# Patient Record
Sex: FEMALE | Race: BLACK OR AFRICAN AMERICAN | NOT HISPANIC OR LATINO | Employment: OTHER | URBAN - METROPOLITAN AREA
[De-identification: names, ages, dates, MRNs, and addresses within clinical notes are randomized per-mention and may not be internally consistent; named-entity substitution may affect disease eponyms.]

---

## 2019-07-09 NOTE — PATIENT DISCUSSION
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 Continue regular follow up with Dr. Billy Kathleen for injections

## 2019-11-20 ENCOUNTER — IMPORTED ENCOUNTER (OUTPATIENT)
Dept: URBAN - METROPOLITAN AREA CLINIC 9 | Facility: CLINIC | Age: 74
End: 2019-11-20

## 2020-01-08 ENCOUNTER — IMPORTED ENCOUNTER (OUTPATIENT)
Dept: URBAN - METROPOLITAN AREA CLINIC 9 | Facility: CLINIC | Age: 75
End: 2020-01-08

## 2020-07-13 ENCOUNTER — IMPORTED ENCOUNTER (OUTPATIENT)
Dept: URBAN - METROPOLITAN AREA CLINIC 9 | Facility: CLINIC | Age: 75
End: 2020-07-13

## 2021-01-04 ENCOUNTER — IMPORTED ENCOUNTER (OUTPATIENT)
Dept: URBAN - METROPOLITAN AREA CLINIC 9 | Facility: CLINIC | Age: 76
End: 2021-01-04

## 2021-06-15 NOTE — PATIENT DISCUSSION
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 Continue regular follow up with Dr. Jasvir Herzog for injections

## 2021-07-06 ENCOUNTER — IMPORTED ENCOUNTER (OUTPATIENT)
Dept: URBAN - METROPOLITAN AREA CLINIC 9 | Facility: CLINIC | Age: 76
End: 2021-07-06

## 2021-07-06 PROBLEM — E11.9: Noted: 2021-07-06

## 2021-10-16 ASSESSMENT — VISUAL ACUITY
OS_SC: 20/20 -3 SN
OS_SC: 20/25 SN
OD_SC: 20/25 + SN
OD_SC: 20/25 -2 SN
OS_SC: 20/30 SN
OD_SC: 20/25 - SN
OS_SC: 20/25 SN
OD_SC: 20/25 -2 SN

## 2021-10-16 ASSESSMENT — TONOMETRY
OD_IOP_MMHG: 12
OD_IOP_MMHG: 16
OD_IOP_MMHG: 20
OS_IOP_MMHG: 21
OS_IOP_MMHG: 16
OD_IOP_MMHG: 16
OS_IOP_MMHG: 17
OS_IOP_MMHG: 13

## 2021-10-16 ASSESSMENT — PACHYMETRY
OS_CT_UM: 560.0
OD_CT_UM: 565.0

## 2021-11-22 NOTE — PROCEDURE NOTE: CLINICAL
PROCEDURE NOTE: Intravitreal Beovu OS. Diagnosis: Neovascular AMD with Active CNV. Anesthesia: Topical/Subconjunctival. Prep: Betadine Drops and Betadine Scrub. Prior to the original injection, risks/benefits/alternatives discussed including infection, loss of vision, hemorrhage, cataract, glaucoma, retinal tears or detachment. The patient wished to proceed with treatment. Betadine prep was performed. Topical anesthesia was induced with Alcaine. A drop of Povidone-iodine 5% ophthalmic solution was instilled over the injection site and in the inferior fornix. A single use prefilled syringe of intravitreal Beovu 6mg/0.05ml was used and excess discarded. The needle was passed 3.0 mm posterior to the limbus in pseudophakic patients, and 3.5 mm posterior to the limbus in phakic patients. Patient tolerated procedure well. The eye was irrigated with sterile irrigating solution. CF vision checked. There were no complications. Post procedure instructions given. Renaldo Rivas

## 2021-11-22 NOTE — PATIENT DISCUSSION
Discussed natural history of ocular condition and the patient understands findings, prognosis and need for follow up care.

## 2021-11-22 NOTE — PATIENT DISCUSSION
Recommended Beovu injection today. The injection was given and tolerated well by patient. Post-injection instructions were reviewed and understood by the patient.

## 2021-12-01 ENCOUNTER — VISUAL FIELD ONLY (OUTPATIENT)
Dept: URBAN - METROPOLITAN AREA CLINIC 6 | Facility: CLINIC | Age: 76
End: 2021-12-01

## 2021-12-01 DIAGNOSIS — H40.013: ICD-10-CM

## 2021-12-01 PROCEDURE — 92083 EXTENDED VISUAL FIELD XM: CPT

## 2022-01-20 NOTE — PROCEDURE NOTE: CLINICAL
PROCEDURE NOTE: Intravitreal Beovu OS. Diagnosis: Neovascular AMD with Active CNV. Anesthesia: Topical/Subconjunctival. Prep: Betadine Drops and Betadine Scrub. Prior to the original injection, risks/benefits/alternatives discussed including infection, loss of vision, hemorrhage, cataract, glaucoma, retinal tears or detachment. The patient wished to proceed with treatment. Betadine prep was performed. Topical anesthesia was induced with Alcaine. A drop of Povidone-iodine 5% ophthalmic solution was instilled over the injection site and in the inferior fornix. A single use prefilled syringe of intravitreal Beovu 6mg/0.05ml was used and excess discarded. The needle was passed 3.0 mm posterior to the limbus in pseudophakic patients, and 3.5 mm posterior to the limbus in phakic patients. Patient tolerated procedure well. The eye was irrigated with sterile irrigating solution. CF vision checked. There were no complications. Post procedure instructions given. Mayeline Rhiannon

## 2022-03-17 NOTE — PROCEDURE NOTE: CLINICAL
PROCEDURE NOTE: Intravitreal Beovu OS. Diagnosis: Neovascular AMD with Active CNV. Anesthesia: Topical/Subconjunctival. Prep: Betadine Drops and Betadine Scrub. Prior to the original injection, risks/benefits/alternatives discussed including infection, loss of vision, hemorrhage, cataract, glaucoma, retinal tears or detachment. The patient wished to proceed with treatment. Betadine prep was performed. Topical anesthesia was induced with Alcaine. A drop of Povidone-iodine 5% ophthalmic solution was instilled over the injection site and in the inferior fornix. A single use prefilled syringe of intravitreal Beovu 6mg/0.05ml was used and excess discarded. The needle was passed 3.0 mm posterior to the limbus in pseudophakic patients, and 3.5 mm posterior to the limbus in phakic patients. Patient tolerated procedure well. The eye was irrigated with sterile irrigating solution. CF vision checked. There were no complications. Post procedure instructions given. Elliot Prajapati

## 2022-04-22 ENCOUNTER — ESTABLISHED PATIENT (OUTPATIENT)
Dept: URBAN - METROPOLITAN AREA CLINIC 6 | Facility: CLINIC | Age: 77
End: 2022-04-22

## 2022-04-22 DIAGNOSIS — H25.13: ICD-10-CM

## 2022-04-22 DIAGNOSIS — H40.013: ICD-10-CM

## 2022-04-22 PROCEDURE — 92012 INTRM OPH EXAM EST PATIENT: CPT

## 2022-04-22 ASSESSMENT — VISUAL ACUITY
OU_SC: 20/20-1
OS_SC: 20/25-1
OD_SC: 20/30+1

## 2022-04-22 ASSESSMENT — TONOMETRY
OD_IOP_MMHG: 21
OS_IOP_MMHG: 21

## 2022-05-12 NOTE — PROCEDURE NOTE: CLINICAL
PROCEDURE NOTE: Intravitreal Beovu OS. Diagnosis: Neovascular AMD with Active CNV. Anesthesia: Topical/Subconjunctival. Prep: Betadine Drops and Betadine Scrub. Prior to the original injection, risks/benefits/alternatives discussed including infection, loss of vision, hemorrhage, cataract, glaucoma, retinal tears or detachment. The patient wished to proceed with treatment. Betadine prep was performed. Topical anesthesia was induced with Alcaine. A drop of Povidone-iodine 5% ophthalmic solution was instilled over the injection site and in the inferior fornix. A single use prefilled syringe of intravitreal Beovu 6mg/0.05ml was used and excess discarded. The needle was passed 3.0 mm posterior to the limbus in pseudophakic patients, and 3.5 mm posterior to the limbus in phakic patients. Patient tolerated procedure well. The eye was irrigated with sterile irrigating solution. CF vision checked. There were no complications. Post procedure instructions given. Ha Joya

## 2022-08-11 NOTE — PROCEDURE NOTE: CLINICAL
PROCEDURE NOTE: Intravitreal Beovu OS. Diagnosis: Neovascular AMD with Active CNV. Anesthesia: Topical/Subconjunctival. Prep: Betadine Drops and Betadine Scrub. Prior to the original injection, risks/benefits/alternatives discussed including infection, loss of vision, hemorrhage, cataract, glaucoma, retinal tears or detachment. The patient wished to proceed with treatment. Betadine prep was performed. Topical anesthesia was induced with Alcaine. A drop of Povidone-iodine 5% ophthalmic solution was instilled over the injection site and in the inferior fornix. A single use prefilled syringe of intravitreal Beovu 6mg/0.05ml was used and excess discarded. The needle was passed 3.0 mm posterior to the limbus in pseudophakic patients, and 3.5 mm posterior to the limbus in phakic patients. Patient tolerated procedure well. The eye was irrigated with sterile irrigating solution. CF vision checked. There were no complications. Post procedure instructions given. Twyla David

## 2022-09-09 ENCOUNTER — ESTABLISHED PATIENT (OUTPATIENT)
Dept: URBAN - METROPOLITAN AREA CLINIC 6 | Facility: CLINIC | Age: 77
End: 2022-09-09

## 2022-09-09 DIAGNOSIS — H40.013: ICD-10-CM

## 2022-09-09 DIAGNOSIS — H43.811: ICD-10-CM

## 2022-09-09 DIAGNOSIS — E11.9: ICD-10-CM

## 2022-09-09 DIAGNOSIS — H25.13: ICD-10-CM

## 2022-09-09 PROCEDURE — 92014 COMPRE OPH EXAM EST PT 1/>: CPT

## 2022-09-09 PROCEDURE — 92134 CPTRZ OPH DX IMG PST SGM RTA: CPT

## 2022-09-09 ASSESSMENT — TONOMETRY
OS_IOP_MMHG: 16
OD_IOP_MMHG: 13

## 2022-09-09 ASSESSMENT — VISUAL ACUITY
OD_SC: 20/25-2
OS_SC: 20/25
OU_SC: 20/20-2

## 2022-09-22 NOTE — PROCEDURE NOTE: CLINICAL
PROCEDURE NOTE: Intravitreal Beovu OS. Diagnosis: Neovascular AMD with Active CNV. Anesthesia: Topical/Subconjunctival. Prep: Betadine Drops and Betadine Scrub. Prior to the original injection, risks/benefits/alternatives discussed including infection, loss of vision, hemorrhage, cataract, glaucoma, retinal tears or detachment. The patient wished to proceed with treatment. Betadine prep was performed. Topical anesthesia was induced with Alcaine. A drop of Povidone-iodine 5% ophthalmic solution was instilled over the injection site and in the inferior fornix. A single use prefilled syringe of intravitreal Beovu 6mg/0.05ml was used and excess discarded. The needle was passed 3.0 mm posterior to the limbus in pseudophakic patients, and 3.5 mm posterior to the limbus in phakic patients. Patient tolerated procedure well. The eye was irrigated with sterile irrigating solution. CF vision checked. There were no complications. Post procedure instructions given. Steve Sanabria

## 2022-09-22 NOTE — PATIENT DISCUSSION
Given the poor response to treatment, a change of the anti-VEGF medication was recommended.  Patient elected to consider trial of Vabysmo injections.

## 2022-11-03 NOTE — PROCEDURE NOTE: CLINICAL
PROCEDURE NOTE: Vabysmo Injection Sample OS. Diagnosis: Neovascular AMD with Active CNV. Anesthesia: Topical. Prep: Betadine Drops and Betadine Scrub. Prior to injection, risks/benefits/alternatives discussed including corneal abrasion, infection, loss of vision, hemorrhage, cataract, glaucoma, retinal tears or detachment. A written consent is on file, and the need for today's injection was discussed and the patient is understanding and wishes to proceed. A 30G needle was placed on a Vabysmo 6mg/0.05ml Pre-filled Syringe. Betadine prep was performed. Topical anesthesia was induced with Alcaine. 4% lidocaine pledge. A lid speculum was used. An intravitreal injection of Vabysmo was given. Injection site: 3-4 mm from the limbus. The used syringe/needle was transferred to a biohazard container. Lid speculum removed. Mask worn during procedure. Patient tolerated procedure well. Count fingers vision was verified. There were no complications. Patient was given the standard instruction sheet. Paola Mir

## 2022-11-03 NOTE — PATIENT DISCUSSION
Recommended Vabyo injection today. The injection was given and tolerated well by patient. Post-injection instructions were reviewed and understood by the patient.

## 2022-12-28 ENCOUNTER — TECH ONLY (OUTPATIENT)
Dept: URBAN - METROPOLITAN AREA CLINIC 6 | Facility: CLINIC | Age: 77
End: 2022-12-28

## 2022-12-28 PROCEDURE — 92083 EXTENDED VISUAL FIELD XM: CPT

## 2023-01-09 NOTE — PROCEDURE NOTE: CLINICAL
PROCEDURE NOTE: Vabysmo Injection OS. Diagnosis: Neovascular AMD with Active CNV. Anesthesia: Topical. Prep: Betadine Drops and Betadine Scrub. Prior to injection, risks/benefits/alternatives discussed including corneal abrasion, infection, loss of vision, hemorrhage, cataract, glaucoma, retinal tears or detachment. A written consent is on file, and the need for today's injection was discussed and the patient is understanding and wishes to proceed. A 30G needle was placed on a Vabysmo 6mg/0.05ml Pre-filled Syringe. Betadine prep was performed. Topical anesthesia was induced with Alcaine. 4% lidocaine pledge. A lid speculum was used. An intravitreal injection of Vabysmo was given. Injection site: 3-4 mm from the limbus. The used syringe/needle was transferred to a biohazard container. Lid speculum removed. Mask worn during procedure. Patient tolerated procedure well. Count fingers vision was verified. There were no complications. Patient was given the standard instruction sheet. Twyla Canter

## 2023-01-17 ENCOUNTER — COMPREHENSIVE EXAM (OUTPATIENT)
Dept: URBAN - METROPOLITAN AREA CLINIC 6 | Facility: CLINIC | Age: 78
End: 2023-01-17

## 2023-01-17 DIAGNOSIS — E11.9: ICD-10-CM

## 2023-01-17 DIAGNOSIS — H43.811: ICD-10-CM

## 2023-01-17 DIAGNOSIS — H40.013: ICD-10-CM

## 2023-01-17 DIAGNOSIS — H25.13: ICD-10-CM

## 2023-01-17 DIAGNOSIS — H04.123: ICD-10-CM

## 2023-01-17 PROCEDURE — 92014 COMPRE OPH EXAM EST PT 1/>: CPT

## 2023-01-17 ASSESSMENT — VISUAL ACUITY
OU_SC: 20/25-1
OS_GLARE: 20/30
OD_GLARE: 20/25
OS_SC: 20/30
OD_SC: 20/30

## 2023-01-17 ASSESSMENT — TONOMETRY
OD_IOP_MMHG: 12
OS_IOP_MMHG: 15

## 2023-07-17 ENCOUNTER — FOLLOW UP (OUTPATIENT)
Dept: URBAN - METROPOLITAN AREA CLINIC 6 | Facility: CLINIC | Age: 78
End: 2023-07-17

## 2023-07-17 DIAGNOSIS — H40.013: ICD-10-CM

## 2023-07-17 DIAGNOSIS — H25.13: ICD-10-CM

## 2023-07-17 DIAGNOSIS — H04.123: ICD-10-CM

## 2023-07-17 PROCEDURE — 92012 INTRM OPH EXAM EST PATIENT: CPT

## 2023-07-17 ASSESSMENT — VISUAL ACUITY
OS_SC: 20/20-1
OU_SC: 20/20
OD_SC: 20/25+2

## 2023-07-17 ASSESSMENT — TONOMETRY
OS_IOP_MMHG: 16
OD_IOP_MMHG: 17

## 2025-03-31 ENCOUNTER — COMPREHENSIVE EXAM (OUTPATIENT)
Age: 80
End: 2025-03-31

## 2025-03-31 DIAGNOSIS — H25.13: ICD-10-CM

## 2025-03-31 DIAGNOSIS — E11.9: ICD-10-CM

## 2025-03-31 DIAGNOSIS — H43.811: ICD-10-CM

## 2025-03-31 DIAGNOSIS — H40.013: ICD-10-CM

## 2025-03-31 DIAGNOSIS — H04.123: ICD-10-CM

## 2025-03-31 PROCEDURE — 92014 COMPRE OPH EXAM EST PT 1/>: CPT

## 2025-03-31 PROCEDURE — 92133 CPTRZD OPH DX IMG PST SGM ON: CPT
